# Patient Record
Sex: FEMALE | Race: WHITE | Employment: FULL TIME | URBAN - METROPOLITAN AREA
[De-identification: names, ages, dates, MRNs, and addresses within clinical notes are randomized per-mention and may not be internally consistent; named-entity substitution may affect disease eponyms.]

---

## 2022-05-11 LAB
AVERAGE GLUCOSE: NORMAL
HBA1C MFR BLD: 5.7 %

## 2022-10-05 ENCOUNTER — OFFICE VISIT (OUTPATIENT)
Dept: OBGYN CLINIC | Age: 57
End: 2022-10-05

## 2022-10-05 VITALS — HEIGHT: 65 IN | BODY MASS INDEX: 30.89 KG/M2 | WEIGHT: 185.4 LBS

## 2022-10-05 DIAGNOSIS — N83.8 OVARIAN MASS, LEFT: Primary | ICD-10-CM

## 2022-10-05 PROCEDURE — 99204 OFFICE O/P NEW MOD 45 MIN: CPT | Performed by: OBSTETRICS & GYNECOLOGY

## 2022-10-05 RX ORDER — FLUTICASONE PROPIONATE 50 MCG
1 SPRAY, SUSPENSION (ML) NASAL DAILY
COMMUNITY

## 2022-10-05 RX ORDER — PREGABALIN 100 MG/1
CAPSULE ORAL
COMMUNITY
Start: 2022-09-18

## 2022-10-05 RX ORDER — DULOXETIN HYDROCHLORIDE 20 MG/1
20 CAPSULE, DELAYED RELEASE ORAL DAILY
COMMUNITY
Start: 2022-08-09 | End: 2023-08-09

## 2022-10-05 RX ORDER — OMEPRAZOLE 40 MG/1
40 CAPSULE, DELAYED RELEASE ORAL DAILY
COMMUNITY

## 2022-10-05 RX ORDER — TRAZODONE HYDROCHLORIDE 100 MG/1
150 TABLET ORAL
COMMUNITY

## 2022-10-05 RX ORDER — ROSUVASTATIN CALCIUM 5 MG/1
TABLET, COATED ORAL
COMMUNITY
Start: 2022-09-12

## 2022-10-05 RX ORDER — ASPIRIN 81 MG/1
81 TABLET ORAL DAILY
COMMUNITY

## 2022-10-05 RX ORDER — PIOGLITAZONEHYDROCHLORIDE 15 MG/1
TABLET ORAL
COMMUNITY
Start: 2022-09-13

## 2022-10-05 RX ORDER — METHOCARBAMOL 750 MG/1
750 TABLET, FILM COATED ORAL 3 TIMES DAILY PRN
COMMUNITY

## 2022-10-05 ASSESSMENT — PATIENT HEALTH QUESTIONNAIRE - PHQ9
SUM OF ALL RESPONSES TO PHQ QUESTIONS 1-9: 0
SUM OF ALL RESPONSES TO PHQ9 QUESTIONS 1 & 2: 0
1. LITTLE INTEREST OR PLEASURE IN DOING THINGS: 0
SUM OF ALL RESPONSES TO PHQ QUESTIONS 1-9: 0
SUM OF ALL RESPONSES TO PHQ QUESTIONS 1-9: 0
2. FEELING DOWN, DEPRESSED OR HOPELESS: 0
SUM OF ALL RESPONSES TO PHQ QUESTIONS 1-9: 0

## 2022-10-06 ENCOUNTER — NURSE ONLY (OUTPATIENT)
Dept: OBGYN CLINIC | Age: 57
End: 2022-10-06

## 2022-10-06 ENCOUNTER — PROCEDURE VISIT (OUTPATIENT)
Dept: OBGYN CLINIC | Age: 57
End: 2022-10-06

## 2022-10-06 DIAGNOSIS — N83.8 OVARIAN MASS, LEFT: Primary | ICD-10-CM

## 2022-10-06 PROCEDURE — 76830 TRANSVAGINAL US NON-OB: CPT | Performed by: OBSTETRICS & GYNECOLOGY

## 2022-10-07 LAB — CANCER AG125 SERPL-ACNC: 2 U/ML (ref 1.5–35)

## 2022-10-10 ENCOUNTER — TELEPHONE (OUTPATIENT)
Dept: OBGYN CLINIC | Age: 57
End: 2022-10-10

## 2022-10-10 ENCOUNTER — PREP FOR PROCEDURE (OUTPATIENT)
Dept: OBGYN CLINIC | Age: 57
End: 2022-10-10

## 2022-10-10 PROBLEM — N83.8 OVARIAN MASS: Status: ACTIVE | Noted: 2022-10-10

## 2022-10-10 NOTE — TELEPHONE ENCOUNTER
Pt calls requesting results from blood work that she had done on Thursday. She is aware that her  was normal. She is scheduled for surgery 11/29. She is asked to call back prn. Voiced full understanding.

## 2022-12-28 ENCOUNTER — TELEPHONE (OUTPATIENT)
Dept: OBGYN CLINIC | Age: 57
End: 2022-12-28

## 2022-12-28 NOTE — TELEPHONE ENCOUNTER
Patient called with questions regarding billing as well as rescheduling pre and post op appointments. She is schedule for 3/21/23 laparoscopic salpingo-oophorectomy with Dr. Jada Lees. She can be reached at [phone]. Routed to surgery scheduler to contact pt.

## 2023-03-10 ENCOUNTER — OFFICE VISIT (OUTPATIENT)
Dept: OBGYN CLINIC | Age: 58
End: 2023-03-10

## 2023-03-10 VITALS
SYSTOLIC BLOOD PRESSURE: 128 MMHG | WEIGHT: 181.6 LBS | HEIGHT: 65 IN | BODY MASS INDEX: 30.26 KG/M2 | DIASTOLIC BLOOD PRESSURE: 84 MMHG

## 2023-03-10 DIAGNOSIS — Z01.818 PRE-OP EXAM: Primary | ICD-10-CM

## 2023-03-10 RX ORDER — ACETAMINOPHEN 160 MG
2000 TABLET,DISINTEGRATING ORAL DAILY
COMMUNITY

## 2023-03-10 RX ORDER — LORATADINE 10 MG/1
10 CAPSULE, LIQUID FILLED ORAL DAILY
COMMUNITY

## 2023-03-10 RX ORDER — LANOLIN ALCOHOL/MO/W.PET/CERES
1000 CREAM (GRAM) TOPICAL DAILY
COMMUNITY

## 2023-03-10 RX ORDER — UREA 10 %
800 LOTION (ML) TOPICAL DAILY
COMMUNITY

## 2023-03-10 RX ORDER — COVID-19 ANTIGEN TEST
2 KIT MISCELLANEOUS 2 TIMES DAILY
COMMUNITY

## 2023-03-10 RX ORDER — SOY ISOFLAVONE 40 MG
TABLET ORAL
COMMUNITY

## 2023-03-10 ASSESSMENT — PATIENT HEALTH QUESTIONNAIRE - PHQ9
SUM OF ALL RESPONSES TO PHQ9 QUESTIONS 1 & 2: 0
1. LITTLE INTEREST OR PLEASURE IN DOING THINGS: 0
SUM OF ALL RESPONSES TO PHQ QUESTIONS 1-9: 0
2. FEELING DOWN, DEPRESSED OR HOPELESS: 0
SUM OF ALL RESPONSES TO PHQ QUESTIONS 1-9: 0

## 2023-03-10 NOTE — PROGRESS NOTES
Preop Visit    Ms. [unfilled] presents for a preop visit. She is scheduled for a Bilateral Salpingo-oophorectomy. Marek Gil Her history, meds, and allergies were reviewed. The procedure was reviewed in detail as well as the risks of bleeding, infection, DVT and potential surgical complications involving the bladder, ureters, colon or intestines. Also the alternatives,  benefits, recovery and follow-up. Prevention of SSI discussed as indicated. All of her questions were answered. Exam:  Lungs CTAB  Heart RRR    See the hospital H&P as well as prior chart for details.       March 10, 2023

## 2023-03-20 NOTE — H&P
Subjective:     Abad García, MRN: [id number], is a 62 y.o.  female presents with large left ovarian cyst. gradually worsening course. See office notes on care. Patient Active Problem List    Diagnosis Date Noted    Ovarian mass 10/10/2022     Past Medical History:   Diagnosis Date    Acid reflux     omeprazole daily    Arthritis     BMI 30.0-30.9,adult     Current smoker     Hyperlipidemia     on med for control    Insomnia     Menopausal symptoms 1998    Had a hysterectomy    Neuropathy     VEL (obstructive sleep apnea)     no c-pap; uses a nasal device    Ovarian cyst     Pancreatitis 02/2022    x1 Acute Episode    Pre-diabetes     oral meds; no bs checks at home; A1c-5.6 on 2/16/23      Past Surgical History:   Procedure Laterality Date    BREAST REDUCTION SURGERY      COLONOSCOPY  2019    COLPOSCOPY      GALLBLADDER SURGERY      HYSTERECTOMY (CERVIX STATUS UNKNOWN)      LAPAROSCOPY      SHOULDER ARTHROSCOPY Right     TONSILLECTOMY      TUBAL LIGATION        No medications prior to admission. Allergies   Allergen Reactions    Penicillins Other (See Comments)     Mouth breaks out in blisters    Sulfamethoxazole-Trimethoprim Rash      Social History     Tobacco Use    Smoking status: Every Day     Packs/day: 0.75     Years: 35.00     Pack years: 26.25     Types: Cigarettes    Smokeless tobacco: Never   Substance Use Topics    Alcohol use: Not Currently     Comment: once a year      Family History   Problem Relation Age of Onset    Migraines Mother     Heart Surgery Father     Stroke Father           Review of Systems  Constitutional: negative  Respiratory: negative  Cardiovascular: negative  Musculoskeletal:negative    Objective:     No data found.   No intake or output data in the 24 hours ending 03/20/23 1325  Ht 5' 5\" (1.651 m)   Wt 181 lb (82.1 kg)   BMI 30.12 kg/m²   General appearance: alert, appears stated age, cooperative, and no distress  Head: Normocephalic, without obvious

## 2023-03-21 ENCOUNTER — ANESTHESIA EVENT (OUTPATIENT)
Dept: SURGERY | Age: 58
End: 2023-03-21
Payer: OTHER GOVERNMENT

## 2023-03-21 ENCOUNTER — HOSPITAL ENCOUNTER (OUTPATIENT)
Age: 58
Setting detail: OUTPATIENT SURGERY
Discharge: HOME | End: 2023-03-21
Attending: OBSTETRICS & GYNECOLOGY | Admitting: OBSTETRICS & GYNECOLOGY
Payer: OTHER GOVERNMENT

## 2023-03-21 ENCOUNTER — ANESTHESIA (OUTPATIENT)
Dept: SURGERY | Age: 58
End: 2023-03-21
Payer: OTHER GOVERNMENT

## 2023-03-21 VITALS
HEART RATE: 61 BPM | RESPIRATION RATE: 20 BRPM | OXYGEN SATURATION: 96 % | HEIGHT: 65 IN | SYSTOLIC BLOOD PRESSURE: 154 MMHG | BODY MASS INDEX: 29.92 KG/M2 | WEIGHT: 179.6 LBS | TEMPERATURE: 98 F | DIASTOLIC BLOOD PRESSURE: 86 MMHG

## 2023-03-21 DIAGNOSIS — N83.8 OVARIAN MASS: Primary | ICD-10-CM

## 2023-03-21 PROBLEM — K85.00 IDIOPATHIC ACUTE PANCREATITIS WITHOUT INFECTION OR NECROSIS: Status: ACTIVE | Noted: 2022-02-07

## 2023-03-21 PROBLEM — G47.33 OBSTRUCTIVE SLEEP APNEA (ADULT) (PEDIATRIC): Status: ACTIVE | Noted: 2023-03-21

## 2023-03-21 PROBLEM — K21.9 GASTROESOPHAGEAL REFLUX DISEASE: Status: ACTIVE | Noted: 2017-08-25

## 2023-03-21 PROBLEM — E78.5 HYPERLIPIDEMIA: Status: ACTIVE | Noted: 2017-08-25

## 2023-03-21 PROBLEM — G60.3 IDIOPATHIC PROGRESSIVE NEUROPATHY: Status: ACTIVE | Noted: 2023-03-21

## 2023-03-21 LAB
EKG ATRIAL RATE: 62 BPM
EKG DIAGNOSIS: NORMAL
EKG P AXIS: 46 DEGREES
EKG P-R INTERVAL: 146 MS
EKG Q-T INTERVAL: 436 MS
EKG QRS DURATION: 72 MS
EKG QTC CALCULATION (BAZETT): 442 MS
EKG R AXIS: -6 DEGREES
EKG T AXIS: 30 DEGREES
EKG VENTRICULAR RATE: 62 BPM
GLUCOSE BLD STRIP.AUTO-MCNC: 114 MG/DL (ref 65–100)
HGB BLD-MCNC: 14.1 G/DL (ref 11.7–15.4)
SERVICE CMNT-IMP: ABNORMAL

## 2023-03-21 PROCEDURE — 2580000003 HC RX 258: Performed by: ANESTHESIOLOGY

## 2023-03-21 PROCEDURE — 6360000002 HC RX W HCPCS: Performed by: OBSTETRICS & GYNECOLOGY

## 2023-03-21 PROCEDURE — 3600000004 HC SURGERY LEVEL 4 BASE: Performed by: OBSTETRICS & GYNECOLOGY

## 2023-03-21 PROCEDURE — 2500000003 HC RX 250 WO HCPCS: Performed by: ANESTHESIOLOGY

## 2023-03-21 PROCEDURE — 6360000002 HC RX W HCPCS: Performed by: NURSE ANESTHETIST, CERTIFIED REGISTERED

## 2023-03-21 PROCEDURE — 6370000000 HC RX 637 (ALT 250 FOR IP): Performed by: ANESTHESIOLOGY

## 2023-03-21 PROCEDURE — 7100000011 HC PHASE II RECOVERY - ADDTL 15 MIN: Performed by: OBSTETRICS & GYNECOLOGY

## 2023-03-21 PROCEDURE — 88307 TISSUE EXAM BY PATHOLOGIST: CPT

## 2023-03-21 PROCEDURE — 2500000003 HC RX 250 WO HCPCS: Performed by: OBSTETRICS & GYNECOLOGY

## 2023-03-21 PROCEDURE — 7100000000 HC PACU RECOVERY - FIRST 15 MIN: Performed by: OBSTETRICS & GYNECOLOGY

## 2023-03-21 PROCEDURE — 2720000010 HC SURG SUPPLY STERILE: Performed by: OBSTETRICS & GYNECOLOGY

## 2023-03-21 PROCEDURE — 3700000000 HC ANESTHESIA ATTENDED CARE: Performed by: OBSTETRICS & GYNECOLOGY

## 2023-03-21 PROCEDURE — 58661 LAPAROSCOPY REMOVE ADNEXA: CPT | Performed by: OBSTETRICS & GYNECOLOGY

## 2023-03-21 PROCEDURE — 6360000002 HC RX W HCPCS: Performed by: ANESTHESIOLOGY

## 2023-03-21 PROCEDURE — 82962 GLUCOSE BLOOD TEST: CPT

## 2023-03-21 PROCEDURE — 2500000003 HC RX 250 WO HCPCS: Performed by: NURSE ANESTHETIST, CERTIFIED REGISTERED

## 2023-03-21 PROCEDURE — 7100000001 HC PACU RECOVERY - ADDTL 15 MIN: Performed by: OBSTETRICS & GYNECOLOGY

## 2023-03-21 PROCEDURE — 7100000010 HC PHASE II RECOVERY - FIRST 15 MIN: Performed by: OBSTETRICS & GYNECOLOGY

## 2023-03-21 PROCEDURE — 2709999900 HC NON-CHARGEABLE SUPPLY: Performed by: OBSTETRICS & GYNECOLOGY

## 2023-03-21 PROCEDURE — 85018 HEMOGLOBIN: CPT

## 2023-03-21 PROCEDURE — 93005 ELECTROCARDIOGRAM TRACING: CPT | Performed by: ANESTHESIOLOGY

## 2023-03-21 PROCEDURE — 2580000003 HC RX 258: Performed by: NURSE ANESTHETIST, CERTIFIED REGISTERED

## 2023-03-21 PROCEDURE — 3600000014 HC SURGERY LEVEL 4 ADDTL 15MIN: Performed by: OBSTETRICS & GYNECOLOGY

## 2023-03-21 PROCEDURE — 88305 TISSUE EXAM BY PATHOLOGIST: CPT

## 2023-03-21 PROCEDURE — 3700000001 HC ADD 15 MINUTES (ANESTHESIA): Performed by: OBSTETRICS & GYNECOLOGY

## 2023-03-21 RX ORDER — SODIUM CHLORIDE 0.9 % (FLUSH) 0.9 %
5-40 SYRINGE (ML) INJECTION PRN
Status: DISCONTINUED | OUTPATIENT
Start: 2023-03-21 | End: 2023-03-21 | Stop reason: HOSPADM

## 2023-03-21 RX ORDER — ONDANSETRON 2 MG/ML
INJECTION INTRAMUSCULAR; INTRAVENOUS PRN
Status: DISCONTINUED | OUTPATIENT
Start: 2023-03-21 | End: 2023-03-21 | Stop reason: SDUPTHER

## 2023-03-21 RX ORDER — GLYCOPYRROLATE 0.2 MG/ML
INJECTION INTRAMUSCULAR; INTRAVENOUS PRN
Status: DISCONTINUED | OUTPATIENT
Start: 2023-03-21 | End: 2023-03-21 | Stop reason: SDUPTHER

## 2023-03-21 RX ORDER — SODIUM CHLORIDE 0.9 % (FLUSH) 0.9 %
5-40 SYRINGE (ML) INJECTION EVERY 12 HOURS SCHEDULED
Status: DISCONTINUED | OUTPATIENT
Start: 2023-03-21 | End: 2023-03-21 | Stop reason: HOSPADM

## 2023-03-21 RX ORDER — OXYCODONE HYDROCHLORIDE 5 MG/1
5 TABLET ORAL PRN
Status: DISCONTINUED | OUTPATIENT
Start: 2023-03-21 | End: 2023-03-21 | Stop reason: HOSPADM

## 2023-03-21 RX ORDER — BUPIVACAINE HYDROCHLORIDE 5 MG/ML
INJECTION, SOLUTION EPIDURAL; INTRACAUDAL PRN
Status: DISCONTINUED | OUTPATIENT
Start: 2023-03-21 | End: 2023-03-21 | Stop reason: HOSPADM

## 2023-03-21 RX ORDER — PROPOFOL 10 MG/ML
INJECTION, EMULSION INTRAVENOUS PRN
Status: DISCONTINUED | OUTPATIENT
Start: 2023-03-21 | End: 2023-03-21 | Stop reason: SDUPTHER

## 2023-03-21 RX ORDER — SODIUM CHLORIDE 9 MG/ML
INJECTION, SOLUTION INTRAVENOUS PRN
Status: DISCONTINUED | OUTPATIENT
Start: 2023-03-21 | End: 2023-03-21 | Stop reason: HOSPADM

## 2023-03-21 RX ORDER — ROCURONIUM BROMIDE 10 MG/ML
INJECTION, SOLUTION INTRAVENOUS PRN
Status: DISCONTINUED | OUTPATIENT
Start: 2023-03-21 | End: 2023-03-21 | Stop reason: SDUPTHER

## 2023-03-21 RX ORDER — OXYCODONE HYDROCHLORIDE AND ACETAMINOPHEN 5; 325 MG/1; MG/1
1 TABLET ORAL EVERY 6 HOURS PRN
Qty: 12 TABLET | Refills: 0 | Status: SHIPPED | OUTPATIENT
Start: 2023-03-21 | End: 2023-03-24

## 2023-03-21 RX ORDER — LABETALOL HYDROCHLORIDE 5 MG/ML
10 INJECTION, SOLUTION INTRAVENOUS
Status: DISCONTINUED | OUTPATIENT
Start: 2023-03-21 | End: 2023-03-21 | Stop reason: HOSPADM

## 2023-03-21 RX ORDER — ALBUTEROL SULFATE 2.5 MG/3ML
2.5 SOLUTION RESPIRATORY (INHALATION)
Status: DISCONTINUED | OUTPATIENT
Start: 2023-03-21 | End: 2023-03-21 | Stop reason: HOSPADM

## 2023-03-21 RX ORDER — LIDOCAINE HYDROCHLORIDE 20 MG/ML
INJECTION, SOLUTION EPIDURAL; INFILTRATION; INTRACAUDAL; PERINEURAL PRN
Status: DISCONTINUED | OUTPATIENT
Start: 2023-03-21 | End: 2023-03-21 | Stop reason: SDUPTHER

## 2023-03-21 RX ORDER — FENTANYL CITRATE 50 UG/ML
INJECTION, SOLUTION INTRAMUSCULAR; INTRAVENOUS PRN
Status: DISCONTINUED | OUTPATIENT
Start: 2023-03-21 | End: 2023-03-21 | Stop reason: SDUPTHER

## 2023-03-21 RX ORDER — IPRATROPIUM BROMIDE AND ALBUTEROL SULFATE 2.5; .5 MG/3ML; MG/3ML
1 SOLUTION RESPIRATORY (INHALATION)
Status: DISCONTINUED | OUTPATIENT
Start: 2023-03-21 | End: 2023-03-21 | Stop reason: HOSPADM

## 2023-03-21 RX ORDER — ONDANSETRON 2 MG/ML
4 INJECTION INTRAMUSCULAR; INTRAVENOUS
Status: DISCONTINUED | OUTPATIENT
Start: 2023-03-21 | End: 2023-03-21 | Stop reason: HOSPADM

## 2023-03-21 RX ORDER — KETOROLAC TROMETHAMINE 30 MG/ML
INJECTION, SOLUTION INTRAMUSCULAR; INTRAVENOUS PRN
Status: DISCONTINUED | OUTPATIENT
Start: 2023-03-21 | End: 2023-03-21 | Stop reason: SDUPTHER

## 2023-03-21 RX ORDER — SODIUM CHLORIDE, SODIUM LACTATE, POTASSIUM CHLORIDE, CALCIUM CHLORIDE 600; 310; 30; 20 MG/100ML; MG/100ML; MG/100ML; MG/100ML
INJECTION, SOLUTION INTRAVENOUS CONTINUOUS PRN
Status: DISCONTINUED | OUTPATIENT
Start: 2023-03-21 | End: 2023-03-21 | Stop reason: SDUPTHER

## 2023-03-21 RX ORDER — OXYCODONE HYDROCHLORIDE 5 MG/1
10 TABLET ORAL PRN
Status: DISCONTINUED | OUTPATIENT
Start: 2023-03-21 | End: 2023-03-21 | Stop reason: HOSPADM

## 2023-03-21 RX ORDER — SODIUM CHLORIDE, SODIUM LACTATE, POTASSIUM CHLORIDE, CALCIUM CHLORIDE 600; 310; 30; 20 MG/100ML; MG/100ML; MG/100ML; MG/100ML
INJECTION, SOLUTION INTRAVENOUS CONTINUOUS
Status: DISCONTINUED | OUTPATIENT
Start: 2023-03-21 | End: 2023-03-21 | Stop reason: HOSPADM

## 2023-03-21 RX ORDER — DEXAMETHASONE SODIUM PHOSPHATE 10 MG/ML
INJECTION INTRAMUSCULAR; INTRAVENOUS PRN
Status: DISCONTINUED | OUTPATIENT
Start: 2023-03-21 | End: 2023-03-21 | Stop reason: SDUPTHER

## 2023-03-21 RX ORDER — LIDOCAINE HYDROCHLORIDE 10 MG/ML
1 INJECTION, SOLUTION INFILTRATION; PERINEURAL
Status: COMPLETED | OUTPATIENT
Start: 2023-03-21 | End: 2023-03-21

## 2023-03-21 RX ORDER — ACETAMINOPHEN 500 MG
1000 TABLET ORAL ONCE
Status: COMPLETED | OUTPATIENT
Start: 2023-03-21 | End: 2023-03-21

## 2023-03-21 RX ORDER — MIDAZOLAM HYDROCHLORIDE 1 MG/ML
2 INJECTION INTRAMUSCULAR; INTRAVENOUS
Status: COMPLETED | OUTPATIENT
Start: 2023-03-21 | End: 2023-03-21

## 2023-03-21 RX ORDER — NEOSTIGMINE METHYLSULFATE 1 MG/ML
INJECTION, SOLUTION INTRAVENOUS PRN
Status: DISCONTINUED | OUTPATIENT
Start: 2023-03-21 | End: 2023-03-21 | Stop reason: SDUPTHER

## 2023-03-21 RX ORDER — DEXTROSE MONOHYDRATE 100 MG/ML
INJECTION, SOLUTION INTRAVENOUS CONTINUOUS PRN
Status: DISCONTINUED | OUTPATIENT
Start: 2023-03-21 | End: 2023-03-21 | Stop reason: HOSPADM

## 2023-03-21 RX ADMIN — ACETAMINOPHEN 1000 MG: 500 TABLET, FILM COATED ORAL at 06:12

## 2023-03-21 RX ADMIN — ROCURONIUM BROMIDE 40 MG: 10 INJECTION, SOLUTION INTRAVENOUS at 07:12

## 2023-03-21 RX ADMIN — LIDOCAINE HYDROCHLORIDE 100 MG: 20 INJECTION, SOLUTION EPIDURAL; INFILTRATION; INTRACAUDAL; PERINEURAL at 07:12

## 2023-03-21 RX ADMIN — FENTANYL CITRATE 100 MCG: 50 INJECTION, SOLUTION INTRAMUSCULAR; INTRAVENOUS at 07:08

## 2023-03-21 RX ADMIN — SODIUM CHLORIDE, POTASSIUM CHLORIDE, SODIUM LACTATE AND CALCIUM CHLORIDE: 600; 310; 30; 20 INJECTION, SOLUTION INTRAVENOUS at 06:20

## 2023-03-21 RX ADMIN — GLYCOPYRROLATE 0.4 MG: 0.2 INJECTION INTRAMUSCULAR; INTRAVENOUS at 07:59

## 2023-03-21 RX ADMIN — Medication 3 MG: at 07:59

## 2023-03-21 RX ADMIN — ONDANSETRON 4 MG: 2 INJECTION INTRAMUSCULAR; INTRAVENOUS at 07:21

## 2023-03-21 RX ADMIN — SODIUM CHLORIDE, SODIUM LACTATE, POTASSIUM CHLORIDE, AND CALCIUM CHLORIDE: 600; 310; 30; 20 INJECTION, SOLUTION INTRAVENOUS at 07:34

## 2023-03-21 RX ADMIN — SODIUM CHLORIDE, SODIUM LACTATE, POTASSIUM CHLORIDE, AND CALCIUM CHLORIDE: 600; 310; 30; 20 INJECTION, SOLUTION INTRAVENOUS at 07:08

## 2023-03-21 RX ADMIN — ROCURONIUM BROMIDE 10 MG: 10 INJECTION, SOLUTION INTRAVENOUS at 07:31

## 2023-03-21 RX ADMIN — PROPOFOL 200 MG: 10 INJECTION, EMULSION INTRAVENOUS at 07:12

## 2023-03-21 RX ADMIN — DEXAMETHASONE SODIUM PHOSPHATE 10 MG: 10 INJECTION INTRAMUSCULAR; INTRAVENOUS at 07:21

## 2023-03-21 RX ADMIN — Medication 2 G: at 07:08

## 2023-03-21 RX ADMIN — MIDAZOLAM 2 MG: 1 INJECTION INTRAMUSCULAR; INTRAVENOUS at 06:51

## 2023-03-21 RX ADMIN — KETOROLAC TROMETHAMINE 30 MG: 30 INJECTION, SOLUTION INTRAMUSCULAR; INTRAVENOUS at 07:57

## 2023-03-21 RX ADMIN — LIDOCAINE HYDROCHLORIDE 1 ML: 10 INJECTION, SOLUTION INFILTRATION; PERINEURAL at 06:19

## 2023-03-21 ASSESSMENT — LIFESTYLE VARIABLES: SMOKING_STATUS: 1

## 2023-03-21 NOTE — DISCHARGE INSTRUCTIONS
INSTRUCTIONS FOLLOWING GYN LAPAROSCOPY    ACTIVITY   Limit activity today; increase activity tomorrow, but no vigorous exercise   Shower only; no tub baths   No douches, tampons or intercourse until your doctor releases you (at least 2 weeks)   May return to work or school as directed by your doctor    DIET   Clear liquids until no nausea or vomiting   Advance to regular diet as tolerated    PAIN   Expect a moderate amount of pain. Take pain medication as directed by your doctor. If no prescription for pain is sent home with you, take the appropriate dose of your commonly used pain medication. Call you doctor if pain is NOT relieved by medication. DO NOT take aspirin or blood thinners until directed by your doctor. You will probably have bloody discharge (like a period) for 1-2 days, then watery discharge for another 7 days. You will want to use a perineal pad, not tampons for this. DRESSING CARE   Change dressing / band aids as directed by your doctor. You will experience bleeding similar to a period for the next couple of days followed by watery discharge up to seven more days. FOLLOW PHONE [facility] will be made by nursing staff. If you have any problems or concerns, call your doctor as needed. CALL YOUR DOCTOR IF   Excessive bleeding that does not stop after holding mild pressure over the area for 15 minutes   You soak a pad in an hour   Temperature of 101°F or above   Green or yellow, smelly drainage or discharge   You are unable to urinate by bedtime   Nausea and vomiting that does not stop by bedtime    AFTER ANESTHESIA   For the next 24 hours: DO NOT Drive, Drink alcoholic beverages, or Make important decisions. Be aware of dizziness following anesthesia and while taking pain medication.    Plan to stay tonight within 1 hour's drive of the hospital.

## 2023-03-21 NOTE — BRIEF OP NOTE
BRIEF OP NOTE  Pre-Op Diagnosis: Ovarian mass [N83.8]  Post-Op Diagnosis: same  Procedure: Procedure(s):  [address] [facility] LAPAROSCOPIC    Surgeon: Norma Mary MD  Assistant(s): none   Anesthesia: GETA   Estimated Blood Loss:  5cc  Specimens:   ID Type Source Tests Collected by Time Destination   A : bilateral tubes and ovaries  Tissue Fallopian Tube SURGICAL PATHOLOGY Won Santiago MD 3/21/2023 0800       Findings: Large left ovarian cyst, clear  Normal right tube/ovary  Complications: none

## 2023-03-21 NOTE — ANESTHESIA POSTPROCEDURE EVALUATION
Department of Anesthesiology  Postprocedure Note    Patient: Dixon Page  MRN: [id number]  YOB: 1965  Date of evaluation: 3/21/2023      Procedure Summary     Date: 03/21/23 Room / Location: [facility] MAIN OR 03 / [facility] MAIN OR    Anesthesia Start: 0708 Anesthesia Stop: 0815    Procedure: SALPINGO OOPHORECTOMY LAPAROSCOPIC (Bilateral: Abdomen) Diagnosis:       Ovarian mass      (Ovarian mass [N83.8])    Surgeons: Yakov Wheeler MD Responsible Provider: Riki Morfin MD    Anesthesia Type: general ASA Status: 2          Anesthesia Type: No value filed.     Luis Phase I: Luis Score: 10    Luis Phase II: Luis Score: 10      Anesthesia Post Evaluation    Patient location during evaluation: PACU  Patient participation: complete - patient participated  Level of consciousness: awake and awake and alert  Airway patency: patent  Nausea & Vomiting: no nausea  Complications: no  Cardiovascular status: hemodynamically stable  Respiratory status: acceptable  Hydration status: euvolemic  Multimodal analgesia pain management approach

## 2023-03-21 NOTE — PERIOP NOTE
Patient verified name and . Order for consent NOT found in EHR; patient verifies procedure. Type 2 surgery, phone assessment complete. Orders NOT received. Labs per surgeon: unknown; no orders received in EHR at time of assessment. Labs per anesthesia protocol: HGB-to be collected dos per patient request.   EKG: to be completed dos per patient request.     Patient answered medical/surgical history questions at their best of ability. All prior to admission medications documented in EPIC. Patient instructed to take the following medications the day of surgery according to anesthesia guidelines with a small sip of water: Claritin, Omeprazole. On the day before surgery please take Acetaminophen 1000mg in the morning and then again before bed. You may substitute for Tylenol 650 mg. Hold all vitamins 7 days prior to surgery and NSAIDS 5 days prior to surgery. Prescription meds to hold prior to surgery: NONE    Patient instructed on the following:    > Arrive at A Entrance, time of arrival to be called the day before by 1700  > NPO after midnight, unless otherwise indicated, including gum, mints, and ice chips  > Responsible adult must drive patient to the hospital, stay during surgery, and patient will need supervision 24 hours after anesthesia  > Use dial antibacterial soap in shower the night before surgery and on the morning of surgery  > All piercings must be removed prior to arrival.    > Leave all valuables (money and jewelry) at home but bring insurance card and ID on DOS.   > You may be required to pay a deductible or co-pay on the day of your procedure. You can pre-pay by calling [phone] if your surgery is at the [facility] or [phone] if your surgery is at the [facility]. > Do not wear make-up, nail polish, lotions, cologne, perfumes, powders, or oil on skin. Artificial nails are not permitted.
Pt VSS stable. Pain and Nausea controlled at this time. Verbal sign out per anesthesiologist when pacu cares completed. Plan of care continues.
Skin normal color for race, warm, dry and intact. No evidence of rash.

## 2023-03-21 NOTE — ANESTHESIA PRE PROCEDURE
Had a hysterectomy    Neuropathy     VEL (obstructive sleep apnea)     no c-pap; uses a nasal device    Ovarian cyst     Pancreatitis 02/2022    x1 Acute Episode    Pre-diabetes     oral meds; no bs checks at home; A1c-5.6 on 2/16/23       Past Surgical History:        Procedure Laterality Date    BREAST REDUCTION SURGERY      COLONOSCOPY  2019    COLPOSCOPY      GALLBLADDER SURGERY      HYSTERECTOMY (CERVIX STATUS UNKNOWN)      LAPAROSCOPY      SHOULDER ARTHROSCOPY Right     TONSILLECTOMY      TUBAL LIGATION         Social History:    Social History     Tobacco Use    Smoking status: Every Day     Packs/day: 0.75     Years: 35.00     Pack years: 26.25     Types: Cigarettes    Smokeless tobacco: Never   Substance Use Topics    Alcohol use: Not Currently     Comment: once a year                                Ready to quit: Not Answered  Counseling given: Not Answered      Vital Signs (Current):   Vitals:    03/15/23 0913 03/21/23 0542   BP:  (!) 156/94   Pulse:  83   Resp:  16   Temp:  97.8 °F (36.6 °C)   TempSrc:  Temporal   SpO2:  97%   Weight: 181 lb (82.1 kg) 179 lb 9.6 oz (81.5 kg)   Height: 5' 5\" (1.651 m) 5' 5\" (1.651 m)                                              BP Readings from Last 3 Encounters:   03/21/23 (!) 156/94   03/10/23 128/84       NPO Status:                                                                                 BMI:   Wt Readings from Last 3 Encounters:   03/21/23 179 lb 9.6 oz (81.5 kg)   03/10/23 181 lb 9.6 oz (82.4 kg)   10/05/22 185 lb 6.4 oz (84.1 kg)     Body mass index is 29.89 kg/m². CBC: No results found for: WBC, RBC, HGB, HCT, MCV, RDW, PLT    CMP: No results found for: NA, K, CL, CO2, BUN, CREATININE, GFRAA, AGRATIO, LABGLOM, GLUCOSE, GLU, PROT, CALCIUM, BILITOT, ALKPHOS, AST, ALT    POC Tests: No results for input(s): POCGLU, POCNA, POCK, POCCL, POCBUN, POCHEMO, POCHCT in the last 72 hours.     Coags: No results found for: PROTIME, INR, APTT    HCG

## 2023-03-22 NOTE — OP NOTE
this was well away from the ureter. This was now taken down the sidewall, and the tube and ovary were now resected away from the sidewall intact. The Endo Catch bag was now placed in the pelvis, and the unruptured cyst was placed in the bag, this was closed and pulled up through the left lower quadrant surface. This was now ruptured to release all the fluid externally and then the ovary was collapsed and this was now pulled through the port with the tube and ovary in the bag. The 10 mm port was now placed back into the pelvis, and attention was turned to the right tube and ovary. There were some filmy adhesions around this area, these were bluntly dissected and then this was pulled away from the sidewall, the Enseal device was again used to cauterize the infundibulopelvic ligament, and the ovary and tube were removed through the left port, no bag was necessary. Irrigation was now carried out. There was no accidental damage noted. At this time, the procedure was terminated as the ports were removed under direct visualization. CO2 was allowed to escape. The ports were now closed at the skin layer with 4-0 Vicryl subcuticular stitch and reinforced with Dermabond. Sponge stick was removed, counts were correct x2. The patient was awakened, taken to recovery room in stable condition. Given pain pills and discharge instructions.       MD FRAN [email or web address]  D:  03/21/2023 23:20  T:  03/22/2023 10:26  JOB #:  [id number]

## 2023-04-04 ENCOUNTER — OFFICE VISIT (OUTPATIENT)
Dept: OBGYN CLINIC | Age: 58
End: 2023-04-04

## 2023-04-04 VITALS
DIASTOLIC BLOOD PRESSURE: 74 MMHG | HEIGHT: 65 IN | WEIGHT: 181.4 LBS | BODY MASS INDEX: 30.22 KG/M2 | SYSTOLIC BLOOD PRESSURE: 116 MMHG

## 2023-04-04 DIAGNOSIS — Z98.890 POST-OPERATIVE STATE: Primary | ICD-10-CM

## 2023-04-04 PROCEDURE — 99024 POSTOP FOLLOW-UP VISIT: CPT | Performed by: OBSTETRICS & GYNECOLOGY

## 2023-04-04 ASSESSMENT — PATIENT HEALTH QUESTIONNAIRE - PHQ9
SUM OF ALL RESPONSES TO PHQ QUESTIONS 1-9: 0
SUM OF ALL RESPONSES TO PHQ QUESTIONS 1-9: 0
2. FEELING DOWN, DEPRESSED OR HOPELESS: 0
1. LITTLE INTEREST OR PLEASURE IN DOING THINGS: 0
SUM OF ALL RESPONSES TO PHQ QUESTIONS 1-9: 0
SUM OF ALL RESPONSES TO PHQ QUESTIONS 1-9: 0
SUM OF ALL RESPONSES TO PHQ9 QUESTIONS 1 & 2: 0

## 2023-04-04 NOTE — PROGRESS NOTES
Liliana Quiroz presents for postop visit from Bilateral Salpingo-oophorectomy, Diagnostic Laparoscopy, and lysis of adhesions  about 2 weeks ago. Doing well postoperatively. without any bleeding. Fever: NO Voiding well: YES. Bowel movements OK: YES. /74 (Site: Left Upper Arm, Position: Sitting)   Ht 5' 5\" (1.651 m)   Wt 181 lb 6.4 oz (82.3 kg)   BMI 30.19 kg/m²     DIAGNOSIS        A:  \" BILATERAL TUBES AND OVARIES\":          SEROUS CYSTADENOMAFIBROMA OF ONE OVARY. SECOND HISTOLOGICALLY   UNREMARKABLE OVARY. SEGMENTS OF FALLOPIAN TUBE (2)   Exam: A&OX3, NAD. Abdomen:  Non tender Incisions clean, dry, intact X 3    A/P. Stable Post op condition. Gradually increase activity. Resumption of sexual activity is  encouraged at this time. Follow up 1 year. She does not want or need HRT at this time.

## 2024-02-06 ENCOUNTER — PROCEDURE VISIT (OUTPATIENT)
Dept: NEUROLOGY | Age: 59
End: 2024-02-06
Payer: OTHER GOVERNMENT

## 2024-02-06 VITALS
SYSTOLIC BLOOD PRESSURE: 143 MMHG | BODY MASS INDEX: 30.16 KG/M2 | DIASTOLIC BLOOD PRESSURE: 89 MMHG | HEART RATE: 72 BPM | HEIGHT: 65 IN | OXYGEN SATURATION: 95 % | WEIGHT: 181 LBS

## 2024-02-06 DIAGNOSIS — G62.9 SENSORY NEUROPATHY: ICD-10-CM

## 2024-02-06 DIAGNOSIS — R20.8 BURNING SENSATION OF FEET: ICD-10-CM

## 2024-02-06 DIAGNOSIS — R20.2 NUMBNESS AND TINGLING OF BOTH FEET: Primary | ICD-10-CM

## 2024-02-06 DIAGNOSIS — R20.0 NUMBNESS AND TINGLING OF BOTH FEET: Primary | ICD-10-CM

## 2024-02-06 PROCEDURE — 95910 NRV CNDJ TEST 7-8 STUDIES: CPT | Performed by: PSYCHIATRY & NEUROLOGY

## 2024-02-06 PROCEDURE — 99204 OFFICE O/P NEW MOD 45 MIN: CPT | Performed by: PSYCHIATRY & NEUROLOGY

## 2024-02-06 PROCEDURE — 95885 MUSC TST DONE W/NERV TST LIM: CPT | Performed by: PSYCHIATRY & NEUROLOGY

## 2024-02-06 RX ORDER — PREGABALIN 50 MG/1
50 CAPSULE ORAL 3 TIMES DAILY
Qty: 90 CAPSULE | Refills: 5 | Status: SHIPPED | OUTPATIENT
Start: 2024-02-06 | End: 2024-08-04

## 2024-02-06 RX ORDER — CETIRIZINE HYDROCHLORIDE 10 MG/1
1 TABLET ORAL NIGHTLY
COMMUNITY
Start: 2023-10-12

## 2024-02-06 ASSESSMENT — ENCOUNTER SYMPTOMS
DOUBLE VISION: 0
VOMITING: 0
NAUSEA: 0
BLURRED VISION: 0
BACK PAIN: 0

## 2024-02-06 ASSESSMENT — VISUAL ACUITY: OU: 1

## 2024-02-06 NOTE — PROGRESS NOTES
EMG/Nerve Conduction Study Procedure Note  [address]    [address]  [place]  [address]   [phone]      Hx:    Exam:     58 y.o.  M  W female  burning feet idiopathic painful polyneuropathy =- eval EMG = numbness paresthesia -- painful numb feet -->>  positive yrs - at [facility] and [facility]... no Known PCP.   She does not have pain mgt.  [facility] D[facility]...   BMI 30.12.  Referring :   [facility]   Allison Rubalcava MD ( [place])..    Technologist: Esperanza Almonte  Height: 5 foot 5 inch       Summary               needle EMG lower extremity muscles as below with CV.       Controlled environmental factors / EMG lab.  Temperature.   NCV : sensory segments:    Abnormal bilateral sural SCV = markedly attenuated SNAP bilaterally but normal timing.  NCV plantar sensory segments:     Deferred.  NCV Motor MCV segments:     Normal bilateral peroneal bilateral tibial MCV.  F-wave studies:         Normal bilateral peroneal bilateral tibial F waves.  H-REFLEX Studies:    Normal bilateral H-reflexes.   NEEDLE EMG:   Tested muscles::    Bilateral TA MG VL muscles = normal equal Normal insertional activity and interference pattern/recruitment.  No fasciculations fibrillations positive sharp waves.  Normal MUP.  No BSS AP.  No giant MUP.  No myotonia.  No upper motor neuron sign.           INTERPRETATION:   THESE FINDINGS ARE BASICALLY NORMAL FINDINGS ON EMG EXCEPT FOR THE DISTAL SENSORY SURAL NERVES REVEALING AXONAL TYPE DECREASE OF SNAP AMPLITUDES BUT NO AXONAL DENERVATION ON NEEDLE TESTING.  SUCH DISTURBANCES AS SMALL FIBER NEUROPATHY COULD BE AT PLAY.              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      Procedure Details:      Sensory neuropathy correlation recommended especially consideration of small fiber neuropathy etc.          Patient made extensively 
capsule, Take 1 capsule by mouth daily, Disp: , Rfl:     rosuvastatin (CRESTOR) 5 MG tablet, Take 1 tablet by mouth at bedtime, Disp: , Rfl:     traZODone (DESYREL) 100 MG tablet, Take 2 tablets by mouth nightly, Disp: , Rfl:     folic acid (FOLVITE) 800 MCG tablet, Take 1 tablet by mouth daily, Disp: , Rfl:     Naproxen Sodium 220 MG CAPS, Take 2 tablets by mouth in the morning and at bedtime, Disp: , Rfl:     loratadine (CLARITIN) 10 MG capsule, Take 1 capsule by mouth daily, Disp: , Rfl:     metFORMIN (GLUCOPHAGE) 500 MG tablet, Take 1 tablet by mouth at bedtime, Disp: , Rfl:     pioglitazone (ACTOS) 15 MG tablet, Take 1 tablet by mouth at bedtime, Disp: , Rfl:       Allergies   Allergen Reactions    Penicillins Other (See Comments)     Mouth breaks out in blisters    Gabapentin Headaches and Rash    Sulfamethoxazole-Trimethoprim Rash       Review of Systems:  Review of Systems   HENT:  Negative for hearing loss and tinnitus.    Eyes:  Negative for blurred vision and double vision.   Gastrointestinal:  Negative for nausea and vomiting.   Musculoskeletal:  Negative for back pain, falls and neck pain.   Neurological:  Positive for sensory change (painful feet.. but not to touch.). Negative for dizziness, tingling, tremors, speech change, seizures, loss of consciousness, weakness and headaches.   Psychiatric/Behavioral:  Negative for depression. The patient is not nervous/anxious and does not have insomnia.    All other systems reviewed and are negative.         Extended / Orthostatic Vitals:    Vitals:    02/06/24 1534   BP: (!) 143/89   Site: Right Upper Arm   Position: Sitting   Cuff Size: Small Adult   Pulse: 72   SpO2: 95%   Weight: 82.1 kg (181 lb)   Height: 1.651 m (5' 5\")        Physical Exam  Vitals reviewed.   Constitutional:       General: She is awake. She is not in acute distress.     Appearance: She is well-developed and well-groomed. She is not ill-appearing, toxic-appearing or diaphoretic.   HENT:

## 2024-03-19 ENCOUNTER — PROCEDURE VISIT (OUTPATIENT)
Dept: NEUROLOGY | Age: 59
End: 2024-03-19

## 2024-03-19 DIAGNOSIS — G62.9 NEUROPATHY: Primary | ICD-10-CM

## 2024-03-19 PROCEDURE — 11105 PUNCH BX SKIN EA SEP/ADDL: CPT | Performed by: PSYCHIATRY & NEUROLOGY

## 2024-03-19 PROCEDURE — 11104 PUNCH BX SKIN SINGLE LESION: CPT | Performed by: PSYCHIATRY & NEUROLOGY

## 2024-03-19 NOTE — PROGRESS NOTES
Skin Biopsy Procedure Note:       Indication:        Consent: Written consent was completed     Assistant: DALIA YOST        The patient was placed on an examining table to the supine side. Total 3 biopsy sites. The bilateral lower limb- The first site was at right foot above the EDB; the second site was at the same side of lateral lower leg 10 cm above the lateral malleolus. The third site the left foot above the EDB / thigh at pubis level / distal dorsal forearm 5 cm above the wrist crease. All sites were steriled with Betadine swabs x 3, and then alcohol patch x 1.  2% Lidocaine was injected intra and subcutaneously in V shape to each site. The biopsy punch and other instrument were provided by [employer]. About 3-4 mm diameter and 4-5 mm depth of dermis/ epidermis was sampled from the above sites, and each placed into individual solution of a test tube provided by [employer]. The label of each tube was correctly matched with the sample. Minimal bleeding was easily stopped by pressure with sterile 4 x 4, then the each biopsy site was covered a wide Band-Aid.         Patient tolerated the procedure well.  Postprocedural instruction was given.  Patient was advised to call for any discomfort related to the procedure.

## 2024-04-03 ENCOUNTER — TELEPHONE (OUTPATIENT)
Dept: NEUROLOGY | Age: 59
End: 2024-04-03

## 2024-04-03 NOTE — TELEPHONE ENCOUNTER
Spoke with pt and gave her the bx results, pt is asking if she needs a F/U appt with you? She also stated that her Lyrica needs to be increased because it is not helping. Current dose is 50 mg TID.

## (undated) DEVICE — SOLUTION IRRIG 1000ML 0.9% SOD CHL USP POUR PLAS BTL

## (undated) DEVICE — TROCAR: Brand: KII® SLEEVE

## (undated) DEVICE — LAPAROSCOPIC TROCAR SLEEVE/SINGLE USE: Brand: KII® OPTICAL ACCESS SYSTEM

## (undated) DEVICE — APPLICATOR MEDICATED 26 CC SOLUTION HI LT ORNG CHLORAPREP

## (undated) DEVICE — TROCAR: Brand: KII FIOS FIRST ENTRY

## (undated) DEVICE — ADHESIVE SKIN CLSR 0.7ML TOP DERMBND ADV

## (undated) DEVICE — SUTURE VCRL SZ 4-0 L18IN ABSRB UD L19MM PS-2 3/8 CIR PRIM J496H

## (undated) DEVICE — GARMENT,MEDLINE,DVT,INT,CALF,MED, GEN2: Brand: MEDLINE

## (undated) DEVICE — GLOVE SURG SZ 75 CRM LTX FREE POLYISOPRENE POLYMER BEAD ANTI

## (undated) DEVICE — TUBING INSUFFLATION SMK EVAC HI FLO SET PNEUMOCLEAR

## (undated) DEVICE — INSUFFLATION NEEDLE TO ESTABLISH PNEUMOPERITONEUM.: Brand: INSUFFLATION NEEDLE

## (undated) DEVICE — PAD PT POS 36 IN SURGYPAD DISP

## (undated) DEVICE — SUTURE COAT VCRL SZ 4-0 L18IN ABSRB UD L19MM PS-2 1/2 CIR J496G

## (undated) DEVICE — GYN LAPAROSCOPY: Brand: MEDLINE INDUSTRIES, INC.

## (undated) DEVICE — SEALER TISS L37CM SHFT DIA5MM 360DEG ROT CVD JAW TAPR TIP

## (undated) DEVICE — INTENDED FOR TISSUE SEPARATION, AND OTHER PROCEDURES THAT REQUIRE A SHARP SURGICAL BLADE TO PUNCTURE OR CUT.: Brand: BARD-PARKER ® STAINLESS STEEL BLADES

## (undated) DEVICE — BAG SPEC REM 224ML W4XL6IN DIA10MM 1 HND GYN DISP ENDOPCH